# Patient Record
Sex: FEMALE | Race: WHITE | NOT HISPANIC OR LATINO | Employment: UNEMPLOYED | URBAN - METROPOLITAN AREA
[De-identification: names, ages, dates, MRNs, and addresses within clinical notes are randomized per-mention and may not be internally consistent; named-entity substitution may affect disease eponyms.]

---

## 2025-01-04 ENCOUNTER — HOSPITAL ENCOUNTER (EMERGENCY)
Facility: HOSPITAL | Age: 65
Discharge: HOME | End: 2025-01-04
Payer: COMMERCIAL

## 2025-01-04 ENCOUNTER — APPOINTMENT (OUTPATIENT)
Dept: RADIOLOGY | Facility: HOSPITAL | Age: 65
End: 2025-01-04
Payer: COMMERCIAL

## 2025-01-04 ENCOUNTER — APPOINTMENT (OUTPATIENT)
Dept: CARDIOLOGY | Facility: HOSPITAL | Age: 65
End: 2025-01-04
Payer: COMMERCIAL

## 2025-01-04 VITALS
TEMPERATURE: 97.5 F | RESPIRATION RATE: 18 BRPM | WEIGHT: 218.03 LBS | BODY MASS INDEX: 36.33 KG/M2 | OXYGEN SATURATION: 93 % | SYSTOLIC BLOOD PRESSURE: 153 MMHG | HEIGHT: 65 IN | DIASTOLIC BLOOD PRESSURE: 68 MMHG | HEART RATE: 76 BPM

## 2025-01-04 DIAGNOSIS — R68.89 FLU-LIKE SYMPTOMS: ICD-10-CM

## 2025-01-04 DIAGNOSIS — J11.1 FLU: Primary | ICD-10-CM

## 2025-01-04 LAB
ALBUMIN SERPL BCP-MCNC: 4 G/DL (ref 3.4–5)
ALP SERPL-CCNC: 80 U/L (ref 33–136)
ALT SERPL W P-5'-P-CCNC: 12 U/L (ref 7–45)
ANION GAP SERPL CALCULATED.3IONS-SCNC: 12 MMOL/L (ref 10–20)
AST SERPL W P-5'-P-CCNC: 20 U/L (ref 9–39)
BASOPHILS # BLD AUTO: 0.08 X10*3/UL (ref 0–0.1)
BASOPHILS NFR BLD AUTO: 0.8 %
BILIRUB SERPL-MCNC: 0.4 MG/DL (ref 0–1.2)
BNP SERPL-MCNC: 298 PG/ML (ref 0–99)
BUN SERPL-MCNC: 15 MG/DL (ref 6–23)
CALCIUM SERPL-MCNC: 8.5 MG/DL (ref 8.6–10.3)
CARDIAC TROPONIN I PNL SERPL HS: 6 NG/L (ref 0–13)
CHLORIDE SERPL-SCNC: 107 MMOL/L (ref 98–107)
CO2 SERPL-SCNC: 21 MMOL/L (ref 21–32)
CREAT SERPL-MCNC: 1.11 MG/DL (ref 0.5–1.05)
EGFRCR SERPLBLD CKD-EPI 2021: 56 ML/MIN/1.73M*2
EOSINOPHIL # BLD AUTO: 0.28 X10*3/UL (ref 0–0.7)
EOSINOPHIL NFR BLD AUTO: 2.7 %
ERYTHROCYTE [DISTWIDTH] IN BLOOD BY AUTOMATED COUNT: 15.1 % (ref 11.5–14.5)
FLUAV RNA RESP QL NAA+PROBE: DETECTED
FLUBV RNA RESP QL NAA+PROBE: NOT DETECTED
GLUCOSE SERPL-MCNC: 139 MG/DL (ref 74–99)
HCT VFR BLD AUTO: 46.7 % (ref 36–46)
HGB BLD-MCNC: 14.9 G/DL (ref 12–16)
IMM GRANULOCYTES # BLD AUTO: 0.05 X10*3/UL (ref 0–0.7)
IMM GRANULOCYTES NFR BLD AUTO: 0.5 % (ref 0–0.9)
LYMPHOCYTES # BLD AUTO: 0.59 X10*3/UL (ref 1.2–4.8)
LYMPHOCYTES NFR BLD AUTO: 5.7 %
MAGNESIUM SERPL-MCNC: 1.69 MG/DL (ref 1.6–2.4)
MCH RBC QN AUTO: 29.3 PG (ref 26–34)
MCHC RBC AUTO-ENTMCNC: 31.9 G/DL (ref 32–36)
MCV RBC AUTO: 92 FL (ref 80–100)
MONOCYTES # BLD AUTO: 0.76 X10*3/UL (ref 0.1–1)
MONOCYTES NFR BLD AUTO: 7.4 %
NEUTROPHILS # BLD AUTO: 8.53 X10*3/UL (ref 1.2–7.7)
NEUTROPHILS NFR BLD AUTO: 82.9 %
NRBC BLD-RTO: 0 /100 WBCS (ref 0–0)
PLATELET # BLD AUTO: 234 X10*3/UL (ref 150–450)
POTASSIUM SERPL-SCNC: 3.4 MMOL/L (ref 3.5–5.3)
PROT SERPL-MCNC: 7 G/DL (ref 6.4–8.2)
RBC # BLD AUTO: 5.09 X10*6/UL (ref 4–5.2)
SARS-COV-2 RNA RESP QL NAA+PROBE: NOT DETECTED
SODIUM SERPL-SCNC: 137 MMOL/L (ref 136–145)
WBC # BLD AUTO: 10.3 X10*3/UL (ref 4.4–11.3)

## 2025-01-04 PROCEDURE — 2500000004 HC RX 250 GENERAL PHARMACY W/ HCPCS (ALT 636 FOR OP/ED)

## 2025-01-04 PROCEDURE — 94640 AIRWAY INHALATION TREATMENT: CPT

## 2025-01-04 PROCEDURE — 36415 COLL VENOUS BLD VENIPUNCTURE: CPT

## 2025-01-04 PROCEDURE — 71046 X-RAY EXAM CHEST 2 VIEWS: CPT

## 2025-01-04 PROCEDURE — 93005 ELECTROCARDIOGRAM TRACING: CPT

## 2025-01-04 PROCEDURE — 84484 ASSAY OF TROPONIN QUANT: CPT

## 2025-01-04 PROCEDURE — 80053 COMPREHEN METABOLIC PANEL: CPT

## 2025-01-04 PROCEDURE — 71046 X-RAY EXAM CHEST 2 VIEWS: CPT | Performed by: STUDENT IN AN ORGANIZED HEALTH CARE EDUCATION/TRAINING PROGRAM

## 2025-01-04 PROCEDURE — 96374 THER/PROPH/DIAG INJ IV PUSH: CPT

## 2025-01-04 PROCEDURE — 87636 SARSCOV2 & INF A&B AMP PRB: CPT

## 2025-01-04 PROCEDURE — 2500000002 HC RX 250 W HCPCS SELF ADMINISTERED DRUGS (ALT 637 FOR MEDICARE OP, ALT 636 FOR OP/ED): Mod: MUE

## 2025-01-04 PROCEDURE — 83880 ASSAY OF NATRIURETIC PEPTIDE: CPT

## 2025-01-04 PROCEDURE — 85025 COMPLETE CBC W/AUTO DIFF WBC: CPT

## 2025-01-04 PROCEDURE — 96376 TX/PRO/DX INJ SAME DRUG ADON: CPT

## 2025-01-04 PROCEDURE — 83735 ASSAY OF MAGNESIUM: CPT

## 2025-01-04 PROCEDURE — 99285 EMERGENCY DEPT VISIT HI MDM: CPT | Mod: 25

## 2025-01-04 RX ORDER — OSELTAMIVIR PHOSPHATE 75 MG/1
75 CAPSULE ORAL EVERY 12 HOURS
Qty: 10 CAPSULE | Refills: 0 | Status: SHIPPED | OUTPATIENT
Start: 2025-01-04 | End: 2025-01-09

## 2025-01-04 RX ORDER — ONDANSETRON HYDROCHLORIDE 2 MG/ML
4 INJECTION, SOLUTION INTRAVENOUS ONCE
Status: COMPLETED | OUTPATIENT
Start: 2025-01-04 | End: 2025-01-04

## 2025-01-04 RX ORDER — IPRATROPIUM BROMIDE AND ALBUTEROL SULFATE 2.5; .5 MG/3ML; MG/3ML
3 SOLUTION RESPIRATORY (INHALATION) ONCE
Status: COMPLETED | OUTPATIENT
Start: 2025-01-04 | End: 2025-01-04

## 2025-01-04 RX ORDER — OSELTAMIVIR PHOSPHATE 75 MG/1
75 CAPSULE ORAL ONCE
Status: COMPLETED | OUTPATIENT
Start: 2025-01-04 | End: 2025-01-04

## 2025-01-04 RX ORDER — ONDANSETRON 4 MG/1
4 TABLET, ORALLY DISINTEGRATING ORAL EVERY 8 HOURS PRN
Qty: 20 TABLET | Refills: 0 | Status: SHIPPED | OUTPATIENT
Start: 2025-01-04 | End: 2025-01-11

## 2025-01-04 RX ADMIN — OSELTAMIVIR PHOSPHATE 75 MG: 75 CAPSULE ORAL at 03:54

## 2025-01-04 RX ADMIN — ONDANSETRON 4 MG: 2 INJECTION INTRAMUSCULAR; INTRAVENOUS at 02:30

## 2025-01-04 RX ADMIN — ONDANSETRON 4 MG: 2 INJECTION INTRAMUSCULAR; INTRAVENOUS at 03:54

## 2025-01-04 RX ADMIN — IPRATROPIUM BROMIDE AND ALBUTEROL SULFATE 3 ML: 2.5; .5 SOLUTION RESPIRATORY (INHALATION) at 02:30

## 2025-01-04 ASSESSMENT — COLUMBIA-SUICIDE SEVERITY RATING SCALE - C-SSRS
2. HAVE YOU ACTUALLY HAD ANY THOUGHTS OF KILLING YOURSELF?: NO
6. HAVE YOU EVER DONE ANYTHING, STARTED TO DO ANYTHING, OR PREPARED TO DO ANYTHING TO END YOUR LIFE?: NO
1. IN THE PAST MONTH, HAVE YOU WISHED YOU WERE DEAD OR WISHED YOU COULD GO TO SLEEP AND NOT WAKE UP?: NO

## 2025-01-04 ASSESSMENT — PAIN - FUNCTIONAL ASSESSMENT
PAIN_FUNCTIONAL_ASSESSMENT: 0-10
PAIN_FUNCTIONAL_ASSESSMENT: 0-10

## 2025-01-04 ASSESSMENT — PAIN SCALES - GENERAL
PAINLEVEL_OUTOF10: 0 - NO PAIN
PAINLEVEL_OUTOF10: 0 - NO PAIN

## 2025-01-04 NOTE — Clinical Note
Cecily Smith was seen and treated in our emergency department on 1/4/2025.  She may return to work on 01/05/2025.       If you have any questions or concerns, please don't hesitate to call.      Thien Navarrete, DO

## 2025-01-04 NOTE — DISCHARGE INSTRUCTIONS
You were seen today for the flu, please follow-up with your primary care provider, like we discussed, I will give you medicine for nausea, and a prescription for Tamiflu.  Please return to the ER for any worsening symptoms.    Thank you for choosing [facility]. It was my pleasure to be involved in your care today.         As of today's visit, based on reasonable likelihood, that it is safe for you to be discharged back to your residence to follow-up as an outpatient for ongoing management of your medical problem. You should follow-up with any referrals / primary provider as soon as possible. The contacts (number, addresses) are listed below.         Important:  Even though we think it is safe for you to go home, there is always a small chance that we are missing something that could require hospitalization.  Therefore it is very important that if you get worse or develops any new symptoms that you return here as soon as possible to be re-evaluated.  This includes return of symptoms that have resolved such as fainting, chest pain, or symptoms that could be warning signs for stroke important:  Even though we think it is safe for you to go home, there is always a small chance that we are missing something that could require hospitalization.  Therefore it is very important that if you get worse or develops any new symptoms that you return here as soon as possible to be re-evaluated.  This includes return of symptoms that have resolved such as fainting, chest pain, or symptoms that could be warning signs for stroke         Make sure your pharmacy and primary doctor is aware of any new medications prescribed today.          It is your responsibility to contact as soon as possible, and follow through with, any referrals you were given today. We do recommend you inform them you are a [facility] ER follow-up patient, as often they can better accommodate your need to be seen, provided their schedules  allow. We will, and have, made every effort to ensure you have access to adequate follow-up specialists available.          All problems may not be able to be fixed in one ER visit. This is why timely ongoing care is important, and this is a responsibility you share in. Further, you are free to follow up with any provider you choose, and this is not limited to our suggestion.          If cultures were obtained today, you will be contacted should anything result that would require further treatment. Please contact the ED at the number provided with questions.          Having trouble affording medications? Try [email or web address]! (This is not a hospital endorsed website, merely a recommendation based on my own personal experiences with [employer])

## 2025-01-04 NOTE — ED PROVIDER NOTES
HPI   No chief complaint on file.      Patient is a 64-year-old female presenting with a chief complaint of generalized weakness and worsening shortness of breath.  Patient is coming from [facility], patient states she has had worsening shortness of breath, called 911, she states she feels like she cannot lay flat, she states she feels like she has noted this worsening shortness of breath and weakness for 3 weeks now, patient states she has had a mildly productive cough, no chest pain, no dizziness or blurred vision, mild nausea, no vomiting, no other acute complaints noted at this time.      History provided by:  Patient          Patient History   No past medical history on file.  No past surgical history on file.  No family history on file.  Social History     Tobacco Use    Smoking status: Not on file    Smokeless tobacco: Not on file   Substance Use Topics    Alcohol use: Not on file    Drug use: Not on file       Physical Exam   ED Triage Vitals   Temp Pulse Resp BP   -- -- -- --      SpO2 Temp src Heart Rate Source Patient Position   -- -- -- --      BP Location FiO2 (%)     -- --       Physical Exam  Vitals and nursing note reviewed. Exam conducted with a chaperone present.   Constitutional:       General: She is not in acute distress.     Appearance: Normal appearance. She is obese. She is not ill-appearing, toxic-appearing or diaphoretic.   HENT:      Head: Normocephalic and atraumatic.      Nose: Nose normal.      Mouth/Throat:      Mouth: Mucous membranes are moist.      Pharynx: Oropharynx is clear.   Eyes:      Extraocular Movements: Extraocular movements intact.      Conjunctiva/sclera: Conjunctivae normal.      Pupils: Pupils are equal, round, and reactive to light.   Cardiovascular:      Rate and Rhythm: Normal rate and regular rhythm.      Pulses: Normal pulses.      Heart sounds: Normal heart sounds.   Pulmonary:      Effort: Pulmonary effort is normal.      Breath sounds: Normal air entry.  Examination of the left-lower field reveals decreased breath sounds. Decreased breath sounds present. No wheezing, rhonchi or rales.   Abdominal:      General: Abdomen is flat. Bowel sounds are normal.   Musculoskeletal:         General: Normal range of motion.   Skin:     General: Skin is warm and dry.      Capillary Refill: Capillary refill takes less than 2 seconds.   Neurological:      General: No focal deficit present.      Mental Status: She is alert and oriented to person, place, and time. Mental status is at baseline.   Psychiatric:         Mood and Affect: Mood normal.         Behavior: Behavior normal.         Thought Content: Thought content normal.         Judgment: Judgment normal.           ED Course & MDM   ED Course as of 01/04/25 0337   Sat Jan 04, 2025   0230 EKG interpreted by myself independently, EKG shows a normal sinus rhythm, rate of 72 bpm, AK interval 198, QRS 82, , QTc 424, patient has no ST elevation or depressions, negative for acute MI. [DONOVAN]      ED Course User Index  [DONOVAN] Thien Navarrete, DO         Diagnoses as of 01/04/25 0337   Flu-like symptoms   Flu                 No data recorded                                 Medical Decision Making  Patient seen and evaluated at bedside, patient is in no acute distress.  I will order a BC, CMP, COVID, flu, chest x-ray, DuoNeb, EKG, troponin, BNP. Differential diagnosis includes but is not limited to viral upper respiratory illness, pneumonia, flu, COVID, ACS..  Patient's testing positive for influenza A, x-ray shows findings consistent with reactive airway disease, formal report listed below.  Shared decision-making is used with the patient, patient has elected to receive Tamiflu, I will prescribe her Tamiflu, as well as a prescription for Zofran.  Return precautions were discussed with the patient, she is agreeable this discharge plan.    Diagnosis: Influenza A  Results for orders placed or performed during the hospital encounter of  01/04/25  -CBC and Auto Differential:   Collection Time: 01/04/25  2:29 AM       Result                      Value             Ref Range           WBC                         10.3              4.4 - 11.3 x*       nRBC                        0.0               0.0 - 0.0 /1*       RBC                         5.09              4.00 - 5.20 *       Hemoglobin                  14.9              12.0 - 16.0 *       Hematocrit                  46.7 (H)          36.0 - 46.0 %       MCV                         92                80 - 100 fL         MCH                         29.3              26.0 - 34.0 *       MCHC                        31.9 (L)          32.0 - 36.0 *       RDW                         15.1 (H)          11.5 - 14.5 %       Platelets                   234               150 - 450 x1*       Neutrophils %               82.9              40.0 - 80.0 %       Immature Granulocytes *     0.5               0.0 - 0.9 %         Lymphocytes %               5.7               13.0 - 44.0 %       Monocytes %                 7.4               2.0 - 10.0 %        Eosinophils %               2.7               0.0 - 6.0 %         Basophils %                 0.8               0.0 - 2.0 %         Neutrophils Absolute        8.53 (H)          1.20 - 7.70 *       Immature Granulocytes *     0.05              0.00 - 0.70 *       Lymphocytes Absolute        0.59 (L)          1.20 - 4.80 *       Monocytes Absolute          0.76              0.10 - 1.00 *       Eosinophils Absolute        0.28              0.00 - 0.70 *       Basophils Absolute          0.08              0.00 - 0.10 *  -Magnesium:   Collection Time: 01/04/25  2:29 AM       Result                      Value             Ref Range           Magnesium                   1.69              1.60 - 2.40 *  -Comprehensive metabolic panel:   Collection Time: 01/04/25  2:29 AM       Result                      Value             Ref Range           Glucose                     139  (H)           74 - 99 mg/dL       Sodium                      137               136 - 145 mm*       Potassium                   3.4 (L)           3.5 - 5.3 mm*       Chloride                    107               98 - 107 mmo*       Bicarbonate                 21                21 - 32 mmol*       Anion Gap                   12                10 - 20 mmol*       Urea Nitrogen               15                6 - 23 mg/dL        Creatinine                  1.11 (H)          0.50 - 1.05 *       eGFR                        56 (L)            >60 mL/min/1*       Calcium                     8.5 (L)           8.6 - 10.3 m*       Albumin                     4.0               3.4 - 5.0 g/*       Alkaline Phosphatase        80                33 - 136 U/L        Total Protein               7.0               6.4 - 8.2 g/*       AST                         20                9 - 39 U/L          Bilirubin, Total            0.4               0.0 - 1.2 mg*       ALT                         12                7 - 45 U/L     -B-Type Natriuretic Peptide:   Collection Time: 01/04/25  2:29 AM       Result                      Value             Ref Range           BNP                         298 (H)           0 - 99 pg/mL   -Troponin I, High Sensitivity, Initial:   Collection Time: 01/04/25  2:29 AM       Result                      Value             Ref Range           Troponin I, High Sensi*     6                 0 - 13 ng/L    -Sars-CoV-2 and Influenza A/B PCR:   Collection Time: 01/04/25  2:30 AM       Result                      Value             Ref Range           Flu A Result                Detected (A)      Not Detected        Flu B Result                Not Detected      Not Detected        Coronavirus 2019, PCR       Not Detected      Not Detected   XR chest 2 views   Final Result    Generalized bronchial wall thickening on significantly changed from    prior study. Correlate for reactive airways disease such as asthma or     chronic bronchitis.                MACRO:    None.          Signed by: Guy Swift 1/4/2025 3:21 AM    Dictation workstation:   [id number]             Procedure  Procedures  Sections of this report were created using voice-to-text technology and may contain errors in translation    Thien Navarrete DO  Emergency Medicine         Thien Navarrete DO  01/04/25 0336

## 2025-01-06 LAB
ATRIAL RATE: 72 BPM
P AXIS: 44 DEGREES
P OFFSET: 179 MS
P ONSET: 118 MS
PR INTERVAL: 198 MS
Q ONSET: 217 MS
QRS COUNT: 12 BEATS
QRS DURATION: 82 MS
QT INTERVAL: 388 MS
QTC CALCULATION(BAZETT): 424 MS
QTC FREDERICIA: 412 MS
R AXIS: 10 DEGREES
T AXIS: 81 DEGREES
T OFFSET: 411 MS
VENTRICULAR RATE: 72 BPM